# Patient Record
(demographics unavailable — no encounter records)

---

## 2025-04-29 NOTE — HISTORY OF PRESENT ILLNESS
[FreeTextEntry1] : The patient is an 82-year-old white male with a past medical history remarkable for lymphoma, carotid artery disease, hypercholesterolemia and an abnormal ECG who presents for evaluation of dyspnea and fatigue. The patient reports dyspnea with ambulating stairs.  He reports fatigue when active and playing golf.  He reports that he is able to exercise on an elliptical  without chest pain.  Recent laboratory results were requested.

## 2025-04-29 NOTE — CARDIOLOGY SUMMARY
[de-identified] : Normal sinus rhythm, Q-wave in III, increased RS ratio V2, no significant change from prior [de-identified] :  - Family History of premature CAD: Mother MI 60 years old - Abnormal ECG: Status post normal nuclear stress test 08/09/2016 - ECHOCARDIOGRAM: 11/16/2022, EF 60-65%, mild aortic insufficiency, trace mitral regurgitation and tricuspid regurgitation RVSP 24, ascending aorta 3.8 cm (PET CT: 8/26/2022, asc Al 4.0cm) (3/22/18, ascending aorta 4.1 cm) ( Normal for the pt's BSA is <4.2cm) - CAROTID ULTRASOUND: 11/16/2022, nonobstructive disease - NUCLEAR STRESS TEST: 8/9/16, normal, EF 58% - Hypercholesterolemia: Ten-year cardiac risk 16.9%, 8/3/15. Statin therapy declined .

## 2025-04-29 NOTE — DISCUSSION/SUMMARY
[FreeTextEntry1] : Dyspnea/fatigue  Laboratory results requested. An echocardiogram was ordered to rule out a cardiomyopathy or valvular abnormality as the etiology of his dyspnea on exertion and fatigue Mr. AVA ANNE  was instructed to undergo treadmill nuclear stress testing to rule out myocardial ischemia as the etiology of his dyspnea on exertion, fatigue, and abnormal ECG  Carotid artery disease Nonobstructive  Hypercholesterolemia Statin therapy declined  Family history of premature coronary artery disease Evaluation as noted above  TTM  [EKG obtained to assist in diagnosis and management of assessed problem(s)] : EKG obtained to assist in diagnosis and management of assessed problem(s)